# Patient Record
Sex: MALE | Race: WHITE | NOT HISPANIC OR LATINO | Employment: OTHER | URBAN - METROPOLITAN AREA
[De-identification: names, ages, dates, MRNs, and addresses within clinical notes are randomized per-mention and may not be internally consistent; named-entity substitution may affect disease eponyms.]

---

## 2017-08-21 ENCOUNTER — ESTABLISHED COMPREHENSIVE EXAM (OUTPATIENT)
Dept: URBAN - METROPOLITAN AREA CLINIC 40 | Facility: CLINIC | Age: 57
End: 2017-08-21

## 2017-08-21 DIAGNOSIS — H52.13: ICD-10-CM

## 2017-08-21 PROCEDURE — 92310-1 LEVEL 1 CONTACT LENS MANAGEMENT

## 2017-08-21 PROCEDURE — 92499OP2 OPTOMAP RETINAL SCREENING BOTH EYES

## 2017-08-21 PROCEDURE — 92014 COMPRE OPH EXAM EST PT 1/>: CPT

## 2017-08-21 PROCEDURE — 92015 DETERMINE REFRACTIVE STATE: CPT

## 2017-08-21 ASSESSMENT — KERATOMETRY
OD_K2POWER_DIOPTERS: 44.75
OD_AXISANGLE_DEGREES: 004
OS_AXISANGLE2_DEGREES: 085
OS_K1POWER_DIOPTERS: 44.00
OS_AXISANGLE_DEGREES: 175
OD_AXISANGLE2_DEGREES: 094
OD_K1POWER_DIOPTERS: 44.00
OS_K2POWER_DIOPTERS: 45.25

## 2017-08-21 ASSESSMENT — VISUAL ACUITY
OD_CC: J1
OU_CC: 20/15-1
OS_CC: J1
OS_CC: 20/20
OU_CC: J1
OD_CC: 20/20

## 2017-08-21 ASSESSMENT — TONOMETRY
OS_IOP_MMHG: 17
OD_IOP_MMHG: 17

## 2017-11-27 ENCOUNTER — ESTABLISHED PATIENT (OUTPATIENT)
Dept: URBAN - METROPOLITAN AREA CLINIC 40 | Facility: CLINIC | Age: 57
End: 2017-11-27

## 2017-11-27 DIAGNOSIS — H43.391: ICD-10-CM

## 2017-11-27 DIAGNOSIS — D31.31: ICD-10-CM

## 2017-11-27 PROCEDURE — 92250 FUNDUS PHOTOGRAPHY W/I&R: CPT

## 2017-11-27 PROCEDURE — 92012 INTRM OPH EXAM EST PATIENT: CPT

## 2017-11-27 ASSESSMENT — KERATOMETRY
OS_AXISANGLE2_DEGREES: 085
OS_K2POWER_DIOPTERS: 45.25
OD_K2POWER_DIOPTERS: 44.75
OD_AXISANGLE_DEGREES: 004
OS_K1POWER_DIOPTERS: 44.00
OD_AXISANGLE2_DEGREES: 094
OS_AXISANGLE_DEGREES: 175
OD_K1POWER_DIOPTERS: 44.00

## 2018-09-04 ENCOUNTER — CONTACT LENS EXAM ESTABLISHED (OUTPATIENT)
Dept: URBAN - METROPOLITAN AREA CLINIC 40 | Facility: CLINIC | Age: 58
End: 2018-09-04

## 2018-09-04 DIAGNOSIS — H43.391: ICD-10-CM

## 2018-09-04 DIAGNOSIS — H52.13: ICD-10-CM

## 2018-09-04 DIAGNOSIS — D31.31: ICD-10-CM

## 2018-09-04 DIAGNOSIS — H52.4: ICD-10-CM

## 2018-09-04 PROCEDURE — 92310-2 LEVEL 2 CONTACT LENS MANAGEMENT

## 2018-09-04 PROCEDURE — 92499OP2 OPTOMAP RETINAL SCREENING BOTH EYES

## 2018-09-04 PROCEDURE — 92014 COMPRE OPH EXAM EST PT 1/>: CPT

## 2018-09-04 PROCEDURE — 92015 DETERMINE REFRACTIVE STATE: CPT

## 2018-09-04 PROCEDURE — 92310PDW PREMIUM SPECIALTY DAILY WEAR

## 2018-09-04 ASSESSMENT — VISUAL ACUITY
OD_SC: J1
OU_SC: J1
OU_CC: 20/20-1
OU_SC: 20/40+2
OD_CC: J1
OU_CC: J1
OS_SC: J1
OD_CC: 20/20-1
OS_SC: 20/50
OD_SC: 20/80

## 2018-09-04 ASSESSMENT — KERATOMETRY
OS_K2POWER_DIOPTERS: 45.00
OS_AXISANGLE2_DEGREES: 088
OD_AXISANGLE2_DEGREES: 097
OS_K1POWER_DIOPTERS: 44.00
OS_AXISANGLE_DEGREES: 178
OD_K2POWER_DIOPTERS: 44.75
OD_K1POWER_DIOPTERS: 43.75
OD_AXISANGLE_DEGREES: 007

## 2018-09-04 ASSESSMENT — TONOMETRY
OD_IOP_MMHG: 16
OS_IOP_MMHG: 17

## 2019-09-16 NOTE — PROCEDURE NOTE: SURGICAL
<p>Prior to commencing surgery patient identification, surgical procedure, site, and side were confirmed by Dr. Salmeron.&nbsp; Following topical proparacaine anesthesia, the patient was positioned at the YAG laser, a contact lens coupled to the cornea of the right eye with methylcellulose and an axial posterior capsulotomy performed without complication using 3.2 Mj x 35. Attention was then turned to the left eye and a contact lens coupled to the cornea of the left eye with methylcellulose and an axial posterior capsulotomy performed without complication using 3.1 Mj x 35. One drop of Alphagan was instilled in both eyes and the patient returned to the holding area having tolerated the procedure well and without complication. </p><p>MRN:[id number]</p>

## 2019-10-03 ENCOUNTER — CONTACT LENS EXAM ESTABLISHED (OUTPATIENT)
Dept: URBAN - METROPOLITAN AREA CLINIC 40 | Facility: CLINIC | Age: 59
End: 2019-10-03

## 2019-10-03 DIAGNOSIS — H52.13: ICD-10-CM

## 2019-10-03 DIAGNOSIS — H04.123: ICD-10-CM

## 2019-10-03 DIAGNOSIS — H52.4: ICD-10-CM

## 2019-10-03 DIAGNOSIS — D31.31: ICD-10-CM

## 2019-10-03 DIAGNOSIS — H43.391: ICD-10-CM

## 2019-10-03 PROCEDURE — 92015 DETERMINE REFRACTIVE STATE: CPT

## 2019-10-03 PROCEDURE — 92014 COMPRE OPH EXAM EST PT 1/>: CPT

## 2019-10-03 PROCEDURE — 92310-1 LEVEL 1 CONTACT LENS MANAGEMENT

## 2019-10-03 PROCEDURE — 92499OP2 OPTOMAP RETINAL SCREENING BOTH EYES

## 2019-10-03 ASSESSMENT — TONOMETRY
OD_IOP_MMHG: 15
OS_IOP_MMHG: 15

## 2019-10-03 ASSESSMENT — VISUAL ACUITY
OU_CC: J1
OD_CC: 20/20-1
OD_CC: J1
OS_CC: J2
OS_CC: 20/20-1
OU_CC: 20/20
OU_SC: 20/40-2

## 2019-10-03 ASSESSMENT — KERATOMETRY
OS_K1POWER_DIOPTERS: 44
OS_AXISANGLE2_DEGREES: 89
OD_K2POWER_DIOPTERS: 44.75
OD_AXISANGLE2_DEGREES: 93
OD_AXISANGLE_DEGREES: 007
OS_K2POWER_DIOPTERS: 45.25
OS_AXISANGLE_DEGREES: 179
OD_K1POWER_DIOPTERS: 43.75
OS_K1POWER_DIOPTERS: 44.00
OS_AXISANGLE2_DEGREES: 088
OS_AXISANGLE_DEGREES: 178
OD_K1POWER_DIOPTERS: 44
OD_AXISANGLE_DEGREES: 3
OD_AXISANGLE2_DEGREES: 097
OS_K2POWER_DIOPTERS: 45.00

## 2020-10-29 ENCOUNTER — CONTACT LENS EXAM ESTABLISHED (OUTPATIENT)
Dept: URBAN - METROPOLITAN AREA CLINIC 40 | Facility: CLINIC | Age: 60
End: 2020-10-29

## 2020-10-29 DIAGNOSIS — H04.123: ICD-10-CM

## 2020-10-29 DIAGNOSIS — H52.4: ICD-10-CM

## 2020-10-29 DIAGNOSIS — H52.13: ICD-10-CM

## 2020-10-29 DIAGNOSIS — D31.31: ICD-10-CM

## 2020-10-29 DIAGNOSIS — H43.391: ICD-10-CM

## 2020-10-29 PROCEDURE — 92499OP2 OPTOMAP RETINAL SCREENING BOTH EYES

## 2020-10-29 PROCEDURE — 92015 DETERMINE REFRACTIVE STATE: CPT

## 2020-10-29 PROCEDURE — 92310-1 LEVEL 1 CONTACT LENS MANAGEMENT

## 2020-10-29 PROCEDURE — 92014 COMPRE OPH EXAM EST PT 1/>: CPT

## 2020-10-29 ASSESSMENT — TONOMETRY
OS_IOP_MMHG: 15
OD_IOP_MMHG: 15

## 2020-10-29 ASSESSMENT — KERATOMETRY
OD_AXISANGLE2_DEGREES: 93
OS_K1POWER_DIOPTERS: 44.00
OD_K2POWER_DIOPTERS: 44.75
OS_AXISANGLE2_DEGREES: 088
OS_K2POWER_DIOPTERS: 45.25
OS_K2POWER_DIOPTERS: 44.75
OD_AXISANGLE_DEGREES: 179
OS_AXISANGLE_DEGREES: 178
OD_K1POWER_DIOPTERS: 44
OD_AXISANGLE2_DEGREES: 89
OD_AXISANGLE_DEGREES: 007
OD_K1POWER_DIOPTERS: 43.75
OD_AXISANGLE2_DEGREES: 097
OS_AXISANGLE_DEGREES: 173
OS_AXISANGLE2_DEGREES: 83
OD_AXISANGLE_DEGREES: 3
OS_K2POWER_DIOPTERS: 45.00
OS_AXISANGLE2_DEGREES: 89
OS_K1POWER_DIOPTERS: 44
OS_AXISANGLE_DEGREES: 179

## 2020-10-29 ASSESSMENT — VISUAL ACUITY
OS_CC: 20/25
OD_CC: 20/25
OU_CC: 20/20-1
OD_CC: J2
OU_CC: J1+
OS_CC: J1

## 2021-11-02 ENCOUNTER — CONTACT LENS EXAM ESTABLISHED (OUTPATIENT)
Dept: URBAN - METROPOLITAN AREA CLINIC 38 | Facility: CLINIC | Age: 61
End: 2021-11-02

## 2021-11-02 DIAGNOSIS — Z46.0: ICD-10-CM

## 2021-11-02 DIAGNOSIS — H52.13: ICD-10-CM

## 2021-11-02 DIAGNOSIS — H52.4: ICD-10-CM

## 2021-11-02 PROCEDURE — 92014 COMPRE OPH EXAM EST PT 1/>: CPT

## 2021-11-02 PROCEDURE — 92015 DETERMINE REFRACTIVE STATE: CPT

## 2021-11-02 PROCEDURE — 92310-1 LEVEL 1 CONTACT LENS MANAGEMENT

## 2021-11-02 ASSESSMENT — VISUAL ACUITY
OU_CC: J1
OS_CC: 20/20-1
OS_CC: J1-
OD_CC: J1-
OD_CC: 20/20-2
OU_CC: 20/20

## 2021-11-02 ASSESSMENT — KERATOMETRY
OS_AXISANGLE2_DEGREES: 088
OS_AXISANGLE_DEGREES: 173
OS_K1POWER_DIOPTERS: 44.00
OD_K1POWER_DIOPTERS: 44
OD_K2POWER_DIOPTERS: 44.75
OD_K1POWER_DIOPTERS: 43.75
OS_AXISANGLE2_DEGREES: 89
OS_AXISANGLE_DEGREES: 179
OS_K1POWER_DIOPTERS: 44
OD_AXISANGLE2_DEGREES: 89
OS_K2POWER_DIOPTERS: 45.00
OD_AXISANGLE_DEGREES: 007
OS_K2POWER_DIOPTERS: 44.75
OS_K2POWER_DIOPTERS: 45.25
OD_AXISANGLE_DEGREES: 3
OD_AXISANGLE2_DEGREES: 93
OD_AXISANGLE2_DEGREES: 097
OS_AXISANGLE2_DEGREES: 83
OS_AXISANGLE_DEGREES: 178
OD_AXISANGLE_DEGREES: 179

## 2021-11-02 ASSESSMENT — TONOMETRY
OS_IOP_MMHG: 14
OD_IOP_MMHG: 14

## 2022-02-02 ENCOUNTER — EMERGENCY VISIT (OUTPATIENT)
Dept: URBAN - METROPOLITAN AREA CLINIC 40 | Facility: CLINIC | Age: 62
End: 2022-02-02

## 2022-02-02 DIAGNOSIS — H01.111: ICD-10-CM

## 2022-02-02 PROCEDURE — 99213 OFFICE O/P EST LOW 20 MIN: CPT

## 2022-02-02 RX ORDER — TOBRAMYCIN AND DEXAMETHASONE 1; 3 MG/ML; MG/ML: 1 SUSPENSION/ DROPS OPHTHALMIC

## 2022-02-02 ASSESSMENT — KERATOMETRY
OS_K1POWER_DIOPTERS: 44.00
OS_AXISANGLE_DEGREES: 173
OS_AXISANGLE_DEGREES: 179
OD_AXISANGLE_DEGREES: 179
OD_AXISANGLE2_DEGREES: 097
OS_AXISANGLE2_DEGREES: 088
OS_K1POWER_DIOPTERS: 44
OD_AXISANGLE_DEGREES: 007
OD_K2POWER_DIOPTERS: 44.75
OD_K1POWER_DIOPTERS: 44
OS_K2POWER_DIOPTERS: 45.25
OS_AXISANGLE2_DEGREES: 83
OS_K2POWER_DIOPTERS: 45.00
OS_AXISANGLE2_DEGREES: 89
OD_AXISANGLE_DEGREES: 3
OS_K2POWER_DIOPTERS: 44.75
OD_AXISANGLE2_DEGREES: 93
OS_AXISANGLE_DEGREES: 178
OD_AXISANGLE2_DEGREES: 89
OD_K1POWER_DIOPTERS: 43.75

## 2022-02-02 ASSESSMENT — VISUAL ACUITY
OU_CC: 20/20
OD_CC: 20/20
OS_CC: 20/30+2

## 2022-02-02 ASSESSMENT — TONOMETRY
OD_IOP_MMHG: 16
OS_IOP_MMHG: 16

## 2022-11-07 ENCOUNTER — COMPREHENSIVE EXAM (OUTPATIENT)
Dept: URBAN - METROPOLITAN AREA CLINIC 38 | Facility: CLINIC | Age: 62
End: 2022-11-07

## 2022-11-07 DIAGNOSIS — Z46.0: ICD-10-CM

## 2022-11-07 DIAGNOSIS — H52.13: ICD-10-CM

## 2022-11-07 DIAGNOSIS — H52.4: ICD-10-CM

## 2022-11-07 PROCEDURE — 92015 DETERMINE REFRACTIVE STATE: CPT

## 2022-11-07 PROCEDURE — 92310-2 LEVEL 2 CONTACT LENS MANAGEMENT

## 2022-11-07 PROCEDURE — 92014 COMPRE OPH EXAM EST PT 1/>: CPT

## 2022-11-07 ASSESSMENT — VISUAL ACUITY
OU_CC: 20/20
OS_CC: J1-
OU_CC: J1
OD_CC: J1
OD_CC: 20/25
OS_CC: 20/20

## 2022-11-07 ASSESSMENT — KERATOMETRY
OD_AXISANGLE_DEGREES: 179
OD_AXISANGLE2_DEGREES: 93
OS_K2POWER_DIOPTERS: 44.75
OS_K1POWER_DIOPTERS: 44.00
OD_AXISANGLE2_DEGREES: 89
OD_K1POWER_DIOPTERS: 44
OD_AXISANGLE_DEGREES: 3
OD_K1POWER_DIOPTERS: 43.75
OS_K2POWER_DIOPTERS: 45.25
OS_AXISANGLE2_DEGREES: 088
OS_AXISANGLE_DEGREES: 173
OD_K2POWER_DIOPTERS: 44.75
OS_AXISANGLE_DEGREES: 179
OD_AXISANGLE_DEGREES: 007
OS_AXISANGLE2_DEGREES: 89
OS_AXISANGLE2_DEGREES: 83
OS_K2POWER_DIOPTERS: 45.00
OS_AXISANGLE_DEGREES: 178
OD_AXISANGLE2_DEGREES: 097
OS_K1POWER_DIOPTERS: 44

## 2022-11-07 ASSESSMENT — TONOMETRY
OD_IOP_MMHG: 18
OS_IOP_MMHG: 18

## 2024-08-14 ASSESSMENT — KERATOMETRY
OS_AXISANGLE2_DEGREES: 088
OS_AXISANGLE_DEGREES: 173
OD_K2POWER_DIOPTERS: 44.75
OS_K2POWER_DIOPTERS: 44.75
OD_AXISANGLE_DEGREES: 007
OS_K1POWER_DIOPTERS: 44.00
OD_K1POWER_DIOPTERS: 44
OD_AXISANGLE2_DEGREES: 097
OS_AXISANGLE_DEGREES: 178
OD_AXISANGLE_DEGREES: 3
OS_AXISANGLE_DEGREES: 179
OS_K1POWER_DIOPTERS: 44
OS_K2POWER_DIOPTERS: 45.00
OD_AXISANGLE2_DEGREES: 89
OS_AXISANGLE2_DEGREES: 89
OS_K2POWER_DIOPTERS: 45.25
OD_AXISANGLE_DEGREES: 179
OD_K1POWER_DIOPTERS: 43.75
OD_AXISANGLE2_DEGREES: 93
OS_AXISANGLE2_DEGREES: 83

## 2024-08-15 ENCOUNTER — COMPREHENSIVE EXAM (OUTPATIENT)
Dept: URBAN - METROPOLITAN AREA CLINIC 40 | Facility: CLINIC | Age: 64
End: 2024-08-15

## 2024-08-15 DIAGNOSIS — H52.13: ICD-10-CM

## 2024-08-15 DIAGNOSIS — H52.4: ICD-10-CM

## 2024-08-15 PROCEDURE — 92310-1 LEVEL 1 CONTACT LENS MANAGEMENT

## 2024-08-15 PROCEDURE — 92015 DETERMINE REFRACTIVE STATE: CPT

## 2024-08-15 PROCEDURE — 92250E RETINAL SCREENING, ELECTIVE

## 2024-08-15 PROCEDURE — 92014 COMPRE OPH EXAM EST PT 1/>: CPT

## 2024-08-15 ASSESSMENT — TONOMETRY
OD_IOP_MMHG: 14
OS_IOP_MMHG: 14

## 2024-08-15 ASSESSMENT — VISUAL ACUITY
OU_SC: 20/25+1
OD_CC: J1
OU_SC: J1
OD_SC: J1
OU_CC: 20/20-1
OS_PH: 20/20
OD_PH: 20/20
OD_CC: 20/20-1
OS_CC: 20/25-1
OD_SC: 20/60-1
OU_CC: J1
OS_CC: J1
OS_SC: 20/40+2
OS_SC: J1 FUZZY

## 2025-02-18 ENCOUNTER — EMERGENCY VISIT (OUTPATIENT)
Age: 65
End: 2025-02-18

## 2025-02-18 DIAGNOSIS — H43.391: ICD-10-CM

## 2025-02-18 DIAGNOSIS — H43.811: ICD-10-CM

## 2025-02-18 PROCEDURE — 99213 OFFICE O/P EST LOW 20 MIN: CPT

## 2025-02-18 PROCEDURE — 92250 FUNDUS PHOTOGRAPHY W/I&R: CPT

## 2025-04-02 ENCOUNTER — FOLLOW UP (OUTPATIENT)
Age: 65
End: 2025-04-02

## 2025-04-02 DIAGNOSIS — H43.811: ICD-10-CM

## 2025-04-02 DIAGNOSIS — H47.091: ICD-10-CM

## 2025-04-02 DIAGNOSIS — H43.391: ICD-10-CM

## 2025-04-02 PROCEDURE — 92014 COMPRE OPH EXAM EST PT 1/>: CPT

## 2025-08-20 ENCOUNTER — COMPREHENSIVE EXAM (OUTPATIENT)
Age: 65
End: 2025-08-20

## 2025-08-20 DIAGNOSIS — H04.123: ICD-10-CM

## 2025-08-20 DIAGNOSIS — H52.13: ICD-10-CM

## 2025-08-20 DIAGNOSIS — H35.433: ICD-10-CM

## 2025-08-20 DIAGNOSIS — H43.811: ICD-10-CM

## 2025-08-20 DIAGNOSIS — H02.831: ICD-10-CM

## 2025-08-20 DIAGNOSIS — H02.834: ICD-10-CM

## 2025-08-20 DIAGNOSIS — H43.391: ICD-10-CM

## 2025-08-20 DIAGNOSIS — H25.813: ICD-10-CM

## 2025-08-20 PROCEDURE — 92015 DETERMINE REFRACTIVE STATE: CPT

## 2025-08-20 PROCEDURE — 92014 COMPRE OPH EXAM EST PT 1/>: CPT

## 2025-08-20 PROCEDURE — 92250E RETINAL SCREENING, ELECTIVE
